# Patient Record
Sex: FEMALE | Race: WHITE | NOT HISPANIC OR LATINO | Employment: STUDENT | URBAN - METROPOLITAN AREA
[De-identification: names, ages, dates, MRNs, and addresses within clinical notes are randomized per-mention and may not be internally consistent; named-entity substitution may affect disease eponyms.]

---

## 2023-11-20 ENCOUNTER — OFFICE VISIT (OUTPATIENT)
Dept: OPHTHALMOLOGY | Facility: CLINIC | Age: 18
End: 2023-11-20
Payer: COMMERCIAL

## 2023-11-20 DIAGNOSIS — M08.1 JUVENILE ANKYLOSING SPONDYLITIS: ICD-10-CM

## 2023-11-20 DIAGNOSIS — M08.40 OLIGOARTICULAR JUVENILE IDIOPATHIC ARTHRITIS: ICD-10-CM

## 2023-11-20 DIAGNOSIS — H20.00 ACUTE ANTERIOR UVEITIS OF LEFT EYE: Primary | ICD-10-CM

## 2023-11-20 PROCEDURE — 1159F PR MEDICATION LIST DOCUMENTED IN MEDICAL RECORD: ICD-10-PCS | Mod: CPTII,S$GLB,, | Performed by: OPTOMETRIST

## 2023-11-20 PROCEDURE — 1160F PR REVIEW ALL MEDS BY PRESCRIBER/CLIN PHARMACIST DOCUMENTED: ICD-10-PCS | Mod: CPTII,S$GLB,, | Performed by: OPTOMETRIST

## 2023-11-20 PROCEDURE — 99204 PR OFFICE/OUTPT VISIT, NEW, LEVL IV, 45-59 MIN: ICD-10-PCS | Mod: S$GLB,,, | Performed by: OPTOMETRIST

## 2023-11-20 PROCEDURE — 99204 OFFICE O/P NEW MOD 45 MIN: CPT | Mod: S$GLB,,, | Performed by: OPTOMETRIST

## 2023-11-20 PROCEDURE — 1159F MED LIST DOCD IN RCRD: CPT | Mod: CPTII,S$GLB,, | Performed by: OPTOMETRIST

## 2023-11-20 PROCEDURE — 1160F RVW MEDS BY RX/DR IN RCRD: CPT | Mod: CPTII,S$GLB,, | Performed by: OPTOMETRIST

## 2023-11-20 PROCEDURE — 99999 PR PBB SHADOW E&M-NEW PATIENT-LVL II: ICD-10-PCS | Mod: PBBFAC,,, | Performed by: OPTOMETRIST

## 2023-11-20 PROCEDURE — 99999 PR PBB SHADOW E&M-NEW PATIENT-LVL II: CPT | Mod: PBBFAC,,, | Performed by: OPTOMETRIST

## 2023-11-20 RX ORDER — PREDNISOLONE SODIUM PHOSPHATE 10 MG/ML
1 SOLUTION/ DROPS OPHTHALMIC 4 TIMES DAILY
Qty: 10 ML | Refills: 0 | Status: SHIPPED | OUTPATIENT
Start: 2023-11-20 | End: 2023-11-27

## 2023-11-20 NOTE — PROGRESS NOTES
HPI     Eye Pain            Comments: Pt states OS has been hurting for almost a week. OS became red   this weekend and  VA has decreased.  Pt states she has a HX of arthritis and has always been checked for   uveitis, but has not been checked in 3 years  Pain Scale:  5  Onset:   about a week ago  OD, OS, OU:   OS  Discharge:   no  A.M. Matting:  no  Itch:   not really  Redness:   yes  Photophobia:   yes  Foreign body sensation:   yes  Deep pain:   no  Previous occurrence:   no  Drops:   Prescribed by urgent care           Last edited by Lakshmi Ackerman on 11/20/2023  2:29 PM.            Assessment /Plan     For exam results, see Encounter Report.    Acute anterior uveitis of left eye    Oligoarticular juvenile idiopathic arthritis    Juvenile ankylosing spondylitis    Other orders  -     prednisoLONE sodium phosphate (INFLAMASE FORTE) 1 % Drop; Place 1 drop into the left eye 4 (four) times daily. for 7 days  Dispense: 10 mL; Refill: 0    2+ cell OS today  Start Prednisolone qid OS until next visit  Monitor 1 week      RTC 1 week for uveitis follow up with mOCT and auto refraction or PRN with any worsening  Discussed above and all questions were answered.

## 2023-11-27 ENCOUNTER — OFFICE VISIT (OUTPATIENT)
Dept: OPHTHALMOLOGY | Facility: CLINIC | Age: 18
End: 2023-11-27
Payer: COMMERCIAL

## 2023-11-27 DIAGNOSIS — H52.13 MYOPIA OF BOTH EYES WITH ASTIGMATISM: ICD-10-CM

## 2023-11-27 DIAGNOSIS — H20.00 ACUTE ANTERIOR UVEITIS OF LEFT EYE: Primary | ICD-10-CM

## 2023-11-27 DIAGNOSIS — M08.1 JUVENILE ANKYLOSING SPONDYLITIS: ICD-10-CM

## 2023-11-27 DIAGNOSIS — M08.40 OLIGOARTICULAR JUVENILE IDIOPATHIC ARTHRITIS: ICD-10-CM

## 2023-11-27 DIAGNOSIS — H52.203 MYOPIA OF BOTH EYES WITH ASTIGMATISM: ICD-10-CM

## 2023-11-27 PROCEDURE — 1159F PR MEDICATION LIST DOCUMENTED IN MEDICAL RECORD: ICD-10-PCS | Mod: CPTII,S$GLB,, | Performed by: OPTOMETRIST

## 2023-11-27 PROCEDURE — 99999 PR PBB SHADOW E&M-EST. PATIENT-LVL II: ICD-10-PCS | Mod: PBBFAC,,, | Performed by: OPTOMETRIST

## 2023-11-27 PROCEDURE — 99214 OFFICE O/P EST MOD 30 MIN: CPT | Mod: S$GLB,,, | Performed by: OPTOMETRIST

## 2023-11-27 PROCEDURE — 99214 PR OFFICE/OUTPT VISIT, EST, LEVL IV, 30-39 MIN: ICD-10-PCS | Mod: S$GLB,,, | Performed by: OPTOMETRIST

## 2023-11-27 PROCEDURE — 99999 PR PBB SHADOW E&M-EST. PATIENT-LVL II: CPT | Mod: PBBFAC,,, | Performed by: OPTOMETRIST

## 2023-11-27 PROCEDURE — 1160F RVW MEDS BY RX/DR IN RCRD: CPT | Mod: CPTII,S$GLB,, | Performed by: OPTOMETRIST

## 2023-11-27 PROCEDURE — 1160F PR REVIEW ALL MEDS BY PRESCRIBER/CLIN PHARMACIST DOCUMENTED: ICD-10-PCS | Mod: CPTII,S$GLB,, | Performed by: OPTOMETRIST

## 2023-11-27 PROCEDURE — 1159F MED LIST DOCD IN RCRD: CPT | Mod: CPTII,S$GLB,, | Performed by: OPTOMETRIST

## 2024-01-16 ENCOUNTER — TELEPHONE (OUTPATIENT)
Dept: OPHTHALMOLOGY | Facility: CLINIC | Age: 19
End: 2024-01-16
Payer: COMMERCIAL

## 2024-01-16 NOTE — TELEPHONE ENCOUNTER
----- Message from Rosie Magana sent at 1/16/2024  8:37 AM CST -----  Patient would like to reschedule the appointment because of the weather. Call back number is [phone]. [email or web address]

## 2024-01-23 ENCOUNTER — OFFICE VISIT (OUTPATIENT)
Dept: OPHTHALMOLOGY | Facility: CLINIC | Age: 19
End: 2024-01-23
Payer: COMMERCIAL

## 2024-01-23 DIAGNOSIS — H20.00 ACUTE ANTERIOR UVEITIS OF LEFT EYE: Primary | ICD-10-CM

## 2024-01-23 DIAGNOSIS — M08.1 JUVENILE ANKYLOSING SPONDYLITIS: ICD-10-CM

## 2024-01-23 DIAGNOSIS — M08.40 OLIGOARTICULAR JUVENILE IDIOPATHIC ARTHRITIS: ICD-10-CM

## 2024-01-23 PROCEDURE — 92012 INTRM OPH EXAM EST PATIENT: CPT | Mod: S$GLB,,, | Performed by: OPTOMETRIST

## 2024-01-23 PROCEDURE — 99999 PR PBB SHADOW E&M-EST. PATIENT-LVL II: CPT | Mod: PBBFAC,,, | Performed by: OPTOMETRIST

## 2024-01-23 PROCEDURE — 1160F RVW MEDS BY RX/DR IN RCRD: CPT | Mod: CPTII,S$GLB,, | Performed by: OPTOMETRIST

## 2024-01-23 PROCEDURE — 1159F MED LIST DOCD IN RCRD: CPT | Mod: CPTII,S$GLB,, | Performed by: OPTOMETRIST

## 2024-01-23 NOTE — PROGRESS NOTES
HPI     Follow-up            Comments: Pt states symptoms have improved, no drops or redness. Pt   states sometimes OS hurts but no redness         Last edited by Lucrecia Vera MA on 1/23/2024  8:07 AM.            Assessment /Plan     For exam results, see Encounter Report.    Acute anterior uveitis of left eye    Juvenile ankylosing spondylitis    Oligoarticular juvenile idiopathic arthritis    Uveitis resolved nicely   Normal IOP OU  Discussed pred qd OU ongoing if achy symptoms persist  Lotemax not covered      RTC PRN if with any new symptoms  Otherwise, RTC 1 yr for dilated eye exam   Discussed above and answered questions.

## 2024-04-09 ENCOUNTER — TELEPHONE (OUTPATIENT)
Dept: OPHTHALMOLOGY | Facility: CLINIC | Age: 19
End: 2024-04-09
Payer: COMMERCIAL

## 2024-04-09 NOTE — TELEPHONE ENCOUNTER
Pt req appt for pain, offered appts today and tomorrow. Pt declined due to school and other appointments. Pt scheduled for Friday as requested

## 2024-04-09 NOTE — TELEPHONE ENCOUNTER
----- Message from Renetta Lee sent at 4/9/2024 11:06 AM CDT -----  Regarding: missed call  Type:  Patient Returning Call    Who Called:Dayanna  Who Left Message for Patient:Lucrecia  Does the patient know what this is regarding?:appt  Would the patient rather a call back or a response via [facility]? Call back  Best Call Back Number: [phone]  Additional Information:

## 2024-04-09 NOTE — TELEPHONE ENCOUNTER
----- Message from Fransico Carroll sent at 4/9/2024 10:55 AM CDT -----  Contact: Dayanna Nieto is calling in regards to getting a call back to discuss appt availability for scheduling due to having pain and rednees in left eye.  Please call back at .[phone]       Thanks

## 2024-04-12 ENCOUNTER — OFFICE VISIT (OUTPATIENT)
Dept: OPHTHALMOLOGY | Facility: CLINIC | Age: 19
End: 2024-04-12
Payer: COMMERCIAL

## 2024-04-12 DIAGNOSIS — H57.89 REDNESS OF EYE, LEFT: Primary | ICD-10-CM

## 2024-04-12 DIAGNOSIS — M08.40 OLIGOARTICULAR JUVENILE IDIOPATHIC ARTHRITIS: ICD-10-CM

## 2024-04-12 DIAGNOSIS — M08.1 JUVENILE ANKYLOSING SPONDYLITIS: ICD-10-CM

## 2024-04-12 PROCEDURE — 1159F MED LIST DOCD IN RCRD: CPT | Mod: CPTII,S$GLB,, | Performed by: OPTOMETRIST

## 2024-04-12 PROCEDURE — 99999 PR PBB SHADOW E&M-EST. PATIENT-LVL II: CPT | Mod: PBBFAC,,, | Performed by: OPTOMETRIST

## 2024-04-12 PROCEDURE — 1160F RVW MEDS BY RX/DR IN RCRD: CPT | Mod: CPTII,S$GLB,, | Performed by: OPTOMETRIST

## 2024-04-12 PROCEDURE — 92012 INTRM OPH EXAM EST PATIENT: CPT | Mod: S$GLB,,, | Performed by: OPTOMETRIST

## 2024-04-12 NOTE — PROGRESS NOTES
"HPI     Eye Pain            Comments: Pt states she has been having some eye pain since Monday. Pt   states it is not as bad as it was but her eye still feels "funny".         Last edited by Grace Oglesby on 4/12/2024 10:18 AM.            Assessment /Plan     For exam results, see Encounter Report.    Redness of eye, left    Juvenile ankylosing spondylitis    Oligoarticular juvenile idiopathic arthritis      Redness of eye  OS only earlier in the week  Symptoms have subsided per pt as the week progressed, but wanted to check for iritis    No significant iritis at this time  Clear conj with isolated cell OS, quiet posterior segment  Normal IOP    No tx required at this time  Will continue to monitor  RTC PRN with any worsening or new symptoms  Discussed above and all questions were answered.                      "

## 2024-11-13 ENCOUNTER — OFFICE VISIT (OUTPATIENT)
Dept: OPHTHALMOLOGY | Facility: CLINIC | Age: 19
End: 2024-11-13
Payer: COMMERCIAL

## 2024-11-13 DIAGNOSIS — M08.40 OLIGOARTICULAR JUVENILE IDIOPATHIC ARTHRITIS: ICD-10-CM

## 2024-11-13 DIAGNOSIS — M08.1 JUVENILE ANKYLOSING SPONDYLITIS: ICD-10-CM

## 2024-11-13 DIAGNOSIS — H20.00 ACUTE ANTERIOR UVEITIS OF LEFT EYE: Primary | ICD-10-CM

## 2024-11-13 PROCEDURE — 1160F RVW MEDS BY RX/DR IN RCRD: CPT | Mod: CPTII,S$GLB,, | Performed by: OPTOMETRIST

## 2024-11-13 PROCEDURE — 99999 PR PBB SHADOW E&M-EST. PATIENT-LVL II: CPT | Mod: PBBFAC,,, | Performed by: OPTOMETRIST

## 2024-11-13 PROCEDURE — 1159F MED LIST DOCD IN RCRD: CPT | Mod: CPTII,S$GLB,, | Performed by: OPTOMETRIST

## 2024-11-13 PROCEDURE — 99213 OFFICE O/P EST LOW 20 MIN: CPT | Mod: S$GLB,,, | Performed by: OPTOMETRIST

## 2024-11-13 RX ORDER — PREDNISOLONE SODIUM PHOSPHATE 10 MG/ML
SOLUTION/ DROPS OPHTHALMIC
Qty: 10 ML | Refills: 0 | Status: SHIPPED | OUTPATIENT
Start: 2024-11-13 | End: 2024-11-13 | Stop reason: SDUPTHER

## 2024-11-13 RX ORDER — PREDNISOLONE SODIUM PHOSPHATE 10 MG/ML
SOLUTION/ DROPS OPHTHALMIC
Qty: 10 ML | Refills: 0 | Status: CANCELLED | OUTPATIENT
Start: 2024-11-13 | End: 2024-12-03

## 2024-11-13 RX ORDER — PREDNISOLONE SODIUM PHOSPHATE 10 MG/ML
SOLUTION/ DROPS OPHTHALMIC
Qty: 10 ML | Refills: 0 | Status: SHIPPED | OUTPATIENT
Start: 2024-11-13 | End: 2024-12-03

## 2024-11-13 NOTE — PROGRESS NOTES
HPI     Eye Pain            Comments: Pt states OS has been having headaches that lead to eye pain   for the past week. Not having much redness this time  Pain Scale:  5  Onset:   1 week  OD, OS, OU:   OS  Discharge:   no  A.M. Matting:  no  Itch:   no  Redness:   no  Photophobia:   yes  Foreign body sensation:   sometimes  Deep pain:   yes, back of eye  Previous occurrence:   yes  Drops:   no           Last edited by Lakshmi Ackerman on 11/13/2024  8:04 AM.            Assessment /Plan     For exam results, see Encounter Report.    Acute anterior uveitis of left eye    Juvenile ankylosing spondylitis    Oligoarticular juvenile idiopathic arthritis    Other orders  -     prednisoLONE sodium phosphate (INFLAMASE FORTE) 1 % Drop; Place 1 drop into the left eye 4 (four) times daily for 7 days, THEN 1 drop 2 (two) times a day for 7 days, THEN 1 drop Daily for 7 days.  Dispense: 10 mL; Refill: 0      Mild cell rxn a/c OS with pain  Clear posterior segment  Start pred as above  Asked pt to RTC PRN if symptom worsen or if not improved x 2 days  Otherwise, follow taper  RTC PRN